# Patient Record
Sex: MALE | Race: WHITE | Employment: FULL TIME | URBAN - METROPOLITAN AREA
[De-identification: names, ages, dates, MRNs, and addresses within clinical notes are randomized per-mention and may not be internally consistent; named-entity substitution may affect disease eponyms.]

---

## 2024-06-11 ENCOUNTER — OFFICE VISIT (OUTPATIENT)
Age: 54
End: 2024-06-11

## 2024-06-11 VITALS — SYSTOLIC BLOOD PRESSURE: 116 MMHG | HEART RATE: 78 BPM | DIASTOLIC BLOOD PRESSURE: 78 MMHG

## 2024-06-11 DIAGNOSIS — R05.1 ACUTE COUGH: Primary | ICD-10-CM

## 2024-06-11 RX ORDER — BUDESONIDE AND FORMOTEROL FUMARATE DIHYDRATE 160; 4.5 UG/1; UG/1
2 AEROSOL RESPIRATORY (INHALATION) 2 TIMES DAILY
Qty: 30.6 G | Refills: 1 | Status: SHIPPED | OUTPATIENT
Start: 2024-06-11 | End: 2024-06-18

## 2024-06-11 NOTE — PROGRESS NOTES
membrane normal. There is impacted cerumen.      Nose: Nose normal.      Mouth/Throat:      Mouth: Mucous membranes are moist.      Pharynx: No oropharyngeal exudate or posterior oropharyngeal erythema.   Eyes:      Pupils: Pupils are equal, round, and reactive to light.   Cardiovascular:      Rate and Rhythm: Normal rate and regular rhythm.      Heart sounds: Normal heart sounds.   Pulmonary:      Effort: Pulmonary effort is normal. No respiratory distress.      Breath sounds: Normal breath sounds. No wheezing, rhonchi or rales.   Musculoskeletal:         General: Normal range of motion.      Cervical back: Neck supple.   Skin:     General: Skin is warm and dry.      Capillary Refill: Capillary refill takes less than 2 seconds.   Neurological:      Mental Status: He is alert and oriented to person, place, and time.   Psychiatric:         Mood and Affect: Mood normal.         Behavior: Behavior is cooperative.         Judgment: Judgment normal.         ASSESSMENT and PLAN    Yue was seen today for cough.    Diagnoses and all orders for this visit:    Acute cough    Other orders  -     SYMBICORT 160-4.5 MCG/ACT AERO; Inhale 2 puffs into the lungs 2 times daily  Coupon given to patient for $35/month copay for insured or uninsured.     Risks versus benefits and side effects of the prescribed medication reviewed with Yue and he verbalized understanding.   A post-infectious (or post viral cough) can last for three to eight weeks.   The following supportive care can usually help you feel better:  Get plenty of rest, eat nutritiously, and maintain adequate hydration to support your immune system.   Moisturize the air. Use a cool mist humidifier or take a steamy shower.  Drink fluids. Liquid helps thin the mucus in your throat. Warm liquids, such as broth tea or lemon juice, can soothe your throat and may calm your cough.  Avoid tobacco smoke. Smoking or breathing secondhand smoke can make your cough worse.  Suck on

## 2024-06-18 ENCOUNTER — OFFICE VISIT (OUTPATIENT)
Age: 54
End: 2024-06-18

## 2024-06-18 DIAGNOSIS — Z76.0: Primary | ICD-10-CM

## 2024-06-18 RX ORDER — BUDESONIDE AND FORMOTEROL FUMARATE DIHYDRATE 160; 4.5 UG/1; UG/1
2 AEROSOL RESPIRATORY (INHALATION) 2 TIMES DAILY
Qty: 30.6 G | Refills: 1 | Status: SHIPPED | OUTPATIENT
Start: 2024-06-18

## 2024-06-18 ASSESSMENT — ENCOUNTER SYMPTOMS
SHORTNESS OF BREATH: 0
CHEST TIGHTNESS: 0
COUGH: 1

## 2024-06-18 NOTE — PROGRESS NOTES
PROGRESS NOTE    SUBJECTIVE/HPI:   Nino Valenzuela is a 53 y.o. male seen for follow up regarding the inhaler he was prescribed last week. He tried to pick it up at [facility] in [place] and they wouldn't accept the coupon card he had because he didn't have insurance. He has been using honey to help decrease coughing and says it has helped a lot.             Current Outpatient Medications   Medication Sig Dispense Refill    SYMBICORT 160-4.5 MCG/ACT AERO Inhale 2 puffs into the lungs 2 times daily 30.6 g 1     No current facility-administered medications for this visit.      Not on File    Social History     Tobacco Use    Smoking status: Never     Passive exposure: Past    Smokeless tobacco: Never   Vaping Use    Vaping Use: Never used        Review of Systems   Constitutional: Negative.    HENT: Negative.     Respiratory:  Positive for cough (resolving; very minimal). Negative for chest tightness and shortness of breath.    Cardiovascular: Negative.    Musculoskeletal: Negative.           OBJECTIVE:  There were no vitals taken for this visit.     No results found for this visit on 06/18/24.    Physical Exam  Vitals reviewed.   Constitutional:       General: He is awake. He is not in acute distress.     Appearance: Normal appearance. He is well-developed and well-groomed.   Pulmonary:      Effort: Pulmonary effort is normal.   Neurological:      Mental Status: He is alert and oriented to person, place, and time.   Psychiatric:         Mood and Affect: Mood normal.         Behavior: Behavior is cooperative.         Judgment: Judgment normal.         ASSESSMENT and PLAN    Diagnoses and all orders for this visit:    Duplicate prescription    Other orders  -     SYMBICORT 160-4.5 MCG/ACT AERO; Inhale 2 puffs into the lungs 2 times daily  Pharmacy changed from [facility] to [facility] [place]; new coupon given.      Start Symbicort as directed. Reviewed administration instructions and side effects.

## 2024-07-09 ENCOUNTER — OFFICE VISIT (OUTPATIENT)
Age: 54
End: 2024-07-09

## 2024-07-09 DIAGNOSIS — Z76.0 MEDICATION REFILL: Primary | ICD-10-CM

## 2024-07-09 NOTE — PROGRESS NOTES
PROGRESS NOTE    SUBJECTIVE/HPI:   Nino Valenzuela is a 53 y.o. male came into the onsite clinic at his place of employment to request a refill of his symbicort inhaler. He says he has been using it at directed and it has helped a lot. He does'nt want to run out next week. He denies coughing, shortness of breath, respiratory symptoms, and denies side effects from the Symbicort.     Chief Complaint    Medication Refill       Current Outpatient Medications   Medication Sig Dispense Refill    SYMBICORT 160-4.5 MCG/ACT AERO Inhale 2 puffs into the lungs 2 times daily 30.6 g 1     No current facility-administered medications for this visit.      No Known Allergies    Social History     Tobacco Use    Smoking status: Never     Passive exposure: Past    Smokeless tobacco: Never   Vaping Use    Vaping Use: Never used        Review of Systems   Constitutional: Negative.    Respiratory:  Negative for chest tightness, shortness of breath and wheezing.    Cardiovascular: Negative.           OBJECTIVE:  There were no vitals taken for this visit.     No results found for this visit on 07/09/24.    Physical Exam  Vitals reviewed.   Constitutional:       General: He is awake. He is not in acute distress.     Appearance: Normal appearance. He is well-developed and well-groomed.   Pulmonary:      Effort: Pulmonary effort is normal.      Breath sounds: Normal breath sounds. No wheezing.   Neurological:      Mental Status: He is alert and oriented to person, place, and time.   Psychiatric:         Mood and Affect: Mood normal.         Behavior: Behavior is cooperative.         Judgment: Judgment normal.         ASSESSMENT and PLAN    Nino was seen today for medication refill.    Diagnoses and all orders for this visit:    Medication refill      I let Yue know that he has a refill at the pharmacy and he can call to let them know he wants it filled. He said he will it later this week when he gets paid. He said he should

## 2024-07-27 ASSESSMENT — ENCOUNTER SYMPTOMS
CHEST TIGHTNESS: 0
SHORTNESS OF BREATH: 0
WHEEZING: 0

## 2024-07-30 ENCOUNTER — OFFICE VISIT (OUTPATIENT)
Age: 54
End: 2024-07-30

## 2024-07-30 VITALS
SYSTOLIC BLOOD PRESSURE: 102 MMHG | DIASTOLIC BLOOD PRESSURE: 62 MMHG | RESPIRATION RATE: 16 BRPM | HEIGHT: 69 IN | HEART RATE: 80 BPM | WEIGHT: 152 LBS | BODY MASS INDEX: 22.51 KG/M2 | OXYGEN SATURATION: 97 %

## 2024-07-30 DIAGNOSIS — Z09 FOLLOW-UP EXAM: Primary | ICD-10-CM

## 2024-07-30 NOTE — PROGRESS NOTES
needed. He would like a referral to a Primary Care Provider in September when his health insurance starts and would like assistance with that.     I have reviewed the patient's medication list, past medical, family, social, and surgical history in detail and updated the patient record appropriately.    Ayanna Seay, APRN - CNP

## 2024-08-06 ENCOUNTER — OFFICE VISIT (OUTPATIENT)
Age: 54
End: 2024-08-06

## 2024-08-06 VITALS
RESPIRATION RATE: 16 BRPM | DIASTOLIC BLOOD PRESSURE: 62 MMHG | SYSTOLIC BLOOD PRESSURE: 108 MMHG | OXYGEN SATURATION: 97 % | HEART RATE: 76 BPM

## 2024-08-06 DIAGNOSIS — N52.9 ERECTILE DISORDER: Primary | ICD-10-CM

## 2024-08-06 ASSESSMENT — ENCOUNTER SYMPTOMS: RESPIRATORY NEGATIVE: 1

## 2024-08-06 NOTE — PROGRESS NOTES
PROGRESS NOTE    SUBJECTIVE:   Nino Valenzuela is a 53 y.o. male seen here in the onsite clinic at his place of employment to ask what he can do about not being able to keep an erection during intercourse. He first noticed this 2-3 years ago and hasn't had a work up for it before. He says his erections last 1-2 minutes.     Chief Complaint    Erectile Dysfunction         Erectile Dysfunction  This is a chronic problem. The problem is unchanged. The nature of his difficulty is maintaining erection. Non-physiologic factors contributing to erectile dysfunction are performance anxiety (says maybe it is performance related). He reports no anxiety or decreased libido. He reports his erection duration to be 1 to 5 minutes. Irritative symptoms include nocturia. Irritative symptoms do not include frequency or urgency. Obstructive symptoms do not include dribbling, incomplete emptying, an intermittent stream, a slower stream, straining or a weak stream. Pertinent negatives include no chills, dysuria, genital pain, hematuria, hesitancy or inability to urinate. Past treatments include nothing.       Current Outpatient Medications   Medication Sig Dispense Refill    SYMBICORT 160-4.5 MCG/ACT AERO Inhale 2 puffs into the lungs 2 times daily 30.6 g 1     No current facility-administered medications for this visit.      No Known Allergies    Social History     Tobacco Use    Smoking status: Never     Passive exposure: Past    Smokeless tobacco: Never   Vaping Use    Vaping Use: Never used        Review of Systems   Constitutional: Negative.  Negative for chills.   Respiratory: Negative.     Cardiovascular: Negative.    Genitourinary:  Positive for enuresis (says he has woken up at night wet several times) and nocturia. Negative for decreased libido, dysuria, frequency, hematuria, hesitancy, incomplete emptying, penile pain, testicular pain and urgency.   Musculoskeletal: Negative.    Psychiatric/Behavioral:  The patient is

## 2024-08-27 ENCOUNTER — OFFICE VISIT (OUTPATIENT)
Age: 54
End: 2024-08-27

## 2024-08-27 VITALS
RESPIRATION RATE: 16 BRPM | HEART RATE: 74 BPM | SYSTOLIC BLOOD PRESSURE: 112 MMHG | DIASTOLIC BLOOD PRESSURE: 70 MMHG | OXYGEN SATURATION: 98 %

## 2024-08-27 DIAGNOSIS — J45.909 ASTHMA, WELL CONTROLLED, UNSPECIFIED ASTHMA SEVERITY, UNSPECIFIED WHETHER PERSISTENT: ICD-10-CM

## 2024-08-27 DIAGNOSIS — Z01.30 BLOOD PRESSURE CHECK: Primary | ICD-10-CM

## 2024-08-27 NOTE — PROGRESS NOTES
PROGRESS NOTE    SUBJECTIVE/HPI:   Nino Valenzuela is a 53 y.o. male seen here in the onsite clinic at his place of employment, [employer], for a check in and to let me know that his inhaler is working and he hasn't been having any coughing spells. When asked if he gets allergies this time of year from the ragweed he said yes but can't afford any antihistamines right now because his rent is due. His brother lives with him and say that helps him with paying the bills. There is a friend of the brother staying with them right now that isn't paying and says when he starts paying that will be even more helpful. He says overall he is doing well and is happy about his insurance starting in a couple of weeks.     Chief Complaint    Follow-up       Current Outpatient Medications   Medication Sig Dispense Refill    SYMBICORT 160-4.5 MCG/ACT AERO Inhale 2 puffs into the lungs 2 times daily 30.6 g 1     No current facility-administered medications for this visit.      No Known Allergies    Social History     Tobacco Use    Smoking status: Never     Passive exposure: Past    Smokeless tobacco: Never   Vaping Use    Vaping status: Never Used        Review of Systems   Constitutional:  Negative for activity change and fatigue.   Cardiovascular:  Negative for chest pain.   Endocrine: Negative.    Neurological:  Negative for dizziness.   Psychiatric/Behavioral:  Negative for sleep disturbance.     OBJECTIVE:  /70 (Site: Right Upper Arm, Position: Sitting, Cuff Size: Medium Adult)   Pulse 74   Resp 16   SpO2 98%      Physical Exam  Constitutional:       General: He is awake. He is not in acute distress.     Appearance: Normal appearance. He is well-developed and well-groomed.   Cardiovascular:      Rate and Rhythm: Normal rate and regular rhythm.      Heart sounds: Normal heart sounds.   Pulmonary:      Effort: Pulmonary effort is normal.      Breath sounds: Normal breath sounds. No wheezing.   Neurological:

## 2024-09-11 ENCOUNTER — OFFICE VISIT (OUTPATIENT)
Age: 54
End: 2024-09-11

## 2024-09-11 VITALS
OXYGEN SATURATION: 95 % | SYSTOLIC BLOOD PRESSURE: 102 MMHG | HEART RATE: 72 BPM | DIASTOLIC BLOOD PRESSURE: 64 MMHG | RESPIRATION RATE: 16 BRPM

## 2024-09-11 DIAGNOSIS — Z76.89 REFERRAL OF PATIENT WITHOUT EXAMINATION OR TREATMENT: Primary | ICD-10-CM

## 2024-09-11 ASSESSMENT — ENCOUNTER SYMPTOMS: RESPIRATORY NEGATIVE: 1

## 2024-09-17 ENCOUNTER — OFFICE VISIT (OUTPATIENT)
Age: 54
End: 2024-09-17

## 2024-09-17 VITALS
RESPIRATION RATE: 16 BRPM | OXYGEN SATURATION: 98 % | HEART RATE: 68 BPM | SYSTOLIC BLOOD PRESSURE: 98 MMHG | DIASTOLIC BLOOD PRESSURE: 68 MMHG

## 2024-09-17 DIAGNOSIS — Z09 ENCOUNTER FOR FOLLOW-UP: Primary | ICD-10-CM

## 2024-09-17 DIAGNOSIS — Z71.85 VACCINE COUNSELING: ICD-10-CM

## 2024-09-23 ASSESSMENT — ENCOUNTER SYMPTOMS
WHEEZING: 0
RESPIRATORY NEGATIVE: 1
COUGH: 0
SHORTNESS OF BREATH: 0

## 2024-10-22 ENCOUNTER — OFFICE VISIT (OUTPATIENT)
Age: 54
End: 2024-10-22

## 2024-10-22 VITALS — DIASTOLIC BLOOD PRESSURE: 64 MMHG | SYSTOLIC BLOOD PRESSURE: 102 MMHG

## 2024-10-22 DIAGNOSIS — Z76.0 MEDICATION REFILL: ICD-10-CM

## 2024-10-22 DIAGNOSIS — Z01.30 BLOOD PRESSURE CHECK: Primary | ICD-10-CM

## 2024-10-22 PROBLEM — J45.909 ASTHMA: Status: ACTIVE | Noted: 2020-11-05

## 2024-10-22 PROBLEM — J44.9 MILD CHRONIC OBSTRUCTIVE PULMONARY DISEASE (HCC): Status: ACTIVE | Noted: 2018-04-04

## 2024-10-22 PROBLEM — G25.81 RESTLESS LEGS SYNDROME: Status: RESOLVED | Noted: 2018-11-05 | Resolved: 2024-10-22

## 2024-10-22 PROBLEM — F41.9 ANXIETY: Status: RESOLVED | Noted: 2018-11-05 | Resolved: 2024-10-22

## 2024-10-22 PROBLEM — R74.8 ELEVATED LIVER ENZYMES: Status: RESOLVED | Noted: 2018-11-05 | Resolved: 2024-10-22

## 2024-10-22 PROBLEM — F41.8 MIXED ANXIETY DEPRESSIVE DISORDER: Status: RESOLVED | Noted: 2018-11-05 | Resolved: 2024-10-22

## 2024-10-22 PROBLEM — D50.9 IRON DEFICIENCY ANEMIA: Status: RESOLVED | Noted: 2018-11-05 | Resolved: 2024-10-22

## 2024-10-22 PROBLEM — N52.9 IMPOTENCE OF ORGANIC ORIGIN: Status: RESOLVED | Noted: 2018-11-05 | Resolved: 2024-10-22

## 2024-10-22 PROBLEM — U07.1 COVID-19 VIRUS INFECTION: Status: RESOLVED | Noted: 2021-02-08 | Resolved: 2024-10-22

## 2024-10-22 PROBLEM — R55 VASOVAGAL SYNDROME: Status: RESOLVED | Noted: 2020-11-05 | Resolved: 2024-10-22

## 2024-10-22 PROBLEM — K21.9 GASTROESOPHAGEAL REFLUX DISEASE WITHOUT ESOPHAGITIS: Status: RESOLVED | Noted: 2018-11-05 | Resolved: 2024-10-22

## 2024-10-22 PROBLEM — Z02.83 ENCOUNTER FOR DRUG SCREENING: Status: RESOLVED | Noted: 2022-06-23 | Resolved: 2024-10-22

## 2024-10-22 PROBLEM — M54.9 BACKACHE: Status: RESOLVED | Noted: 2018-11-05 | Resolved: 2024-10-22

## 2024-10-22 PROBLEM — D53.9 DEFICIENCY ANEMIA: Status: RESOLVED | Noted: 2018-11-05 | Resolved: 2024-10-22

## 2024-10-22 RX ORDER — BUDESONIDE AND FORMOTEROL FUMARATE DIHYDRATE 160; 4.5 UG/1; UG/1
2 AEROSOL RESPIRATORY (INHALATION) 2 TIMES DAILY
Qty: 30.6 G | Refills: 1 | Status: SHIPPED | OUTPATIENT
Start: 2024-10-22

## 2024-10-22 ASSESSMENT — ENCOUNTER SYMPTOMS: RESPIRATORY NEGATIVE: 1

## 2024-10-22 NOTE — PROGRESS NOTES
[employer]  ONSITE CLINIC  PROGRESS NOTE    SUBJECTIVE/HPI:   Nino Valenzuela is a 54 y.o. male seen here in the onsite clinic at his place of employment, [employer]. he has a new Primary Care Provider that he will start seeing in December. He orginally signed up today to get his labwork drawn but says he forgot and ate this morning so will just get a blood pressure check. He says he is going to  his Symbicort inhaler next week. He hasn't been having any respiratory problems and feels well overall.     Chief Complaint    Blood Pressure Check       Current Outpatient Medications   Medication Sig Dispense Refill    budesonide-formoterol (SYMBICORT) 160-4.5 MCG/ACT AERO Inhale 2 puffs into the lungs 2 times daily 30.6 g 1     No current facility-administered medications for this visit.      No Known Allergies    Social History     Tobacco Use    Smoking status: Never     Passive exposure: Past    Smokeless tobacco: Never   Vaping Use    Vaping status: Never Used        Review of Systems   Constitutional: Negative.    Respiratory: Negative.     Cardiovascular: Negative.           OBJECTIVE:  /64 (Site: Right Upper Arm, Position: Sitting, Cuff Size: Medium Adult)      No results found for this visit on 10/22/24.    Physical Exam  Constitutional:       General: He is awake. He is not in acute distress.     Appearance: Normal appearance. He is well-developed and well-groomed.   Pulmonary:      Effort: Pulmonary effort is normal.   Skin:     General: Skin is warm and dry.   Neurological:      Mental Status: He is alert and oriented to person, place, and time.   Psychiatric:         Mood and Affect: Mood normal.         Behavior: Behavior is cooperative.         Judgment: Judgment normal.         ASSESSMENT and PLAN    Nino \"Yue\" was seen today for blood pressure check.    Diagnoses and all orders for this visit:    Blood pressure check    Medication refill  -     budesonide-formoterol

## 2024-10-29 ENCOUNTER — OFFICE VISIT (OUTPATIENT)
Age: 54
End: 2024-10-29

## 2024-10-29 VITALS — DIASTOLIC BLOOD PRESSURE: 72 MMHG | SYSTOLIC BLOOD PRESSURE: 108 MMHG

## 2024-10-29 DIAGNOSIS — Z01.30 BLOOD PRESSURE CHECK: Primary | ICD-10-CM

## 2024-10-29 DIAGNOSIS — Z71.85 VACCINE COUNSELING: ICD-10-CM

## 2024-10-29 NOTE — PROGRESS NOTES
[facility]  ONSITE CLINIC  PROGRESS NOTE    SUBJECTIVE/HPI:   Nino Valenzuela is a 54 y.o. male seen here in the onsite clinic at his place of employment, [employer] for a blood pressure check and to check in. He has a Primary Care Provider appointment in December to establish care. He hasn't picked his prescription up yet for his refill of symbicort but says he will this week. He has no complaints.    Chief Complaint    Blood Pressure Check       Current Outpatient Medications   Medication Sig Dispense Refill    budesonide-formoterol (SYMBICORT) 160-4.5 MCG/ACT AERO Inhale 2 puffs into the lungs 2 times daily 30.6 g 1     No current facility-administered medications for this visit.      No Known Allergies    Social History     Tobacco Use    Smoking status: Never     Passive exposure: Past    Smokeless tobacco: Never   Vaping Use    Vaping status: Never Used        Review of Systems   Constitutional: Negative.    Respiratory: Negative.     Cardiovascular: Negative.           OBJECTIVE:  /72 (Site: Right Upper Arm, Position: Sitting, Cuff Size: Medium Adult)      No results found for this visit on 10/29/24.    Physical Exam  Constitutional:       General: He is awake. He is not in acute distress.     Appearance: Normal appearance. He is well-developed and well-groomed.   Pulmonary:      Effort: Pulmonary effort is normal.   Neurological:      Mental Status: He is alert and oriented to person, place, and time.   Psychiatric:         Mood and Affect: Mood normal.         Behavior: Behavior is cooperative.         Judgment: Judgment normal.         ASSESSMENT and PLAN    Nino \"Yue\" was seen today for blood pressure check.    Diagnoses and all orders for this visit:    Blood pressure check    Vaccine counseling  I let Yue know that the guidelines for pneumonia vaccine had been updated to include anyone over 50 years of age and that with asthma he should get a vaccine either way. I have him the

## 2024-10-31 ASSESSMENT — ENCOUNTER SYMPTOMS: RESPIRATORY NEGATIVE: 1

## 2024-11-12 ENCOUNTER — OFFICE VISIT (OUTPATIENT)
Age: 54
End: 2024-11-12

## 2024-11-12 DIAGNOSIS — Z71.89 OTHER SPECIFIED COUNSELING: Primary | ICD-10-CM

## 2024-11-12 NOTE — PROGRESS NOTES
[facility]  ONSITE CLINIC  PROGRESS NOTE    SUBJECTIVE/HPI:   Nino Valenzuela is a 54 y.o. male seen here in the onsite clinic at his place of employment, [employer] just to check in. he has a Primary Care Provider that he will start seeing as a new patient in December. Yue relayed that hasn't picked up his inhaler yet due to financial strain and he mentioned that someone gave him some cans of salmon which will help. When asked if he would like to know about food [place] in the area where he might be able to get groceries he said that would be helpful. He denies any other complaints or concerns and says he has been breathing well with no coughing or wheezing.   Food Insecurity: Food Insecurity Present (11/12/2024)    Hunger Vital Sign     Worried About Running Out of Food in the Last Year: Sometimes true     Ran Out of Food in the Last Year: Sometimes true     Chief Complaint    Blood Pressure Check       Current Outpatient Medications   Medication Sig Dispense Refill    budesonide-formoterol (SYMBICORT) 160-4.5 MCG/ACT AERO Inhale 2 puffs into the lungs 2 times daily 30.6 g 1     No current facility-administered medications for this visit.      No Known Allergies    Social History     Tobacco Use    Smoking status: Never     Passive exposure: Past    Smokeless tobacco: Never   Vaping Use    Vaping status: Never Used        Review of Systems   Constitutional: Negative.    Respiratory: Negative.     Cardiovascular: Negative.           OBJECTIVE:  /72 (Site: Right Upper Arm, Position: Sitting, Cuff Size: Medium Adult)   Pulse 73   Resp 16   SpO2 96%      No results found for this visit on 11/12/24.    Physical Exam  Constitutional:       General: He is awake. He is not in acute distress.     Appearance: Normal appearance. He is well-developed and well-groomed.   Pulmonary:      Effort: Pulmonary effort is normal.   Neurological:      Mental Status: He is alert and oriented to person, place, and

## 2024-11-13 VITALS
OXYGEN SATURATION: 96 % | SYSTOLIC BLOOD PRESSURE: 112 MMHG | RESPIRATION RATE: 16 BRPM | DIASTOLIC BLOOD PRESSURE: 72 MMHG | HEART RATE: 73 BPM

## 2024-11-13 SDOH — ECONOMIC STABILITY: FOOD INSECURITY: WITHIN THE PAST 12 MONTHS, THE FOOD YOU BOUGHT JUST DIDN'T LAST AND YOU DIDN'T HAVE MONEY TO GET MORE.: SOMETIMES TRUE

## 2024-11-13 SDOH — ECONOMIC STABILITY: FOOD INSECURITY: WITHIN THE PAST 12 MONTHS, YOU WORRIED THAT YOUR FOOD WOULD RUN OUT BEFORE YOU GOT MONEY TO BUY MORE.: SOMETIMES TRUE

## 2024-11-13 ASSESSMENT — ENCOUNTER SYMPTOMS: RESPIRATORY NEGATIVE: 1

## 2024-11-19 ENCOUNTER — OFFICE VISIT (OUTPATIENT)
Age: 54
End: 2024-11-19

## 2024-11-19 DIAGNOSIS — Z00.00 WELLNESS EXAMINATION: Primary | ICD-10-CM

## 2024-11-19 DIAGNOSIS — Z12.5 SCREENING FOR PROSTATE CANCER: ICD-10-CM

## 2024-11-19 DIAGNOSIS — Z13.29 SCREENING FOR THYROID DISORDER: ICD-10-CM

## 2024-11-19 DIAGNOSIS — G25.81 RESTLESS LEGS SYNDROME (RLS): ICD-10-CM

## 2024-11-19 DIAGNOSIS — N52.9 ORGANIC IMPOTENCE: ICD-10-CM

## 2024-11-19 NOTE — PROGRESS NOTES
[facility] [employer]  Onsite Clinic  PROGRESS NOTE    SUBJECTIVE:   Nino Valenzuela is a 54 y.o. male seen for lab work here at his place of employment, [employer]. He has a new patient appointment on December 12th to establish care at [facility]. He also would like to know if he can get printed directions to the appointment from his house because his phone is turned off right now. He denies any complaints and says he feels well today. He thinks it has been at least 5 years or more since he last had any screening labs drawn.     Chief Complaint    Labs Only      OBJECTIVE:  There were no vitals taken for this visit.     Physical Exam  Constitutional:       General: He is awake. He is not in acute distress.     Appearance: Normal appearance. He is well-developed and well-groomed.   Pulmonary:      Effort: Pulmonary effort is normal.   Neurological:      Mental Status: He is alert and oriented to person, place, and time.   Psychiatric:         Mood and Affect: Mood normal.         Behavior: Behavior is cooperative.         Judgment: Judgment normal.         ASSESSMENT and PLAN    Nino \"Yue\" was seen today for labs only.    Diagnoses and all orders for this visit:    Wellness examination  -     CBC with Auto Differential  -     Comprehensive Metabolic Panel  -     Lipid Panel W/ Chol/HDL Ratio ([facility] Default)  -     PSA Screening  -     TSH    Screening for thyroid disorder  -     TSH    Screening for prostate cancer  -     PSA Screening    Organic impotence  Comments:  history of  Orders:  -     PSA Screening    Restless legs syndrome (RLS)  Comments:  history of  Orders:  -     CBC with Auto Differential  -     Comprehensive Metabolic Panel  -     TSH    Fasting labs obtained Right AC first attempt via venipuncture without complications and will be sent to [facility]. Results to be available in 1-3 business days. Appointment scheduled for next week to review the results and results will be

## 2024-11-20 LAB
ALBUMIN SERPL-MCNC: 4.5 G/DL (ref 3.8–4.9)
ALP SERPL-CCNC: 70 IU/L (ref 44–121)
ALT SERPL-CCNC: 28 IU/L (ref 0–44)
AST SERPL-CCNC: 22 IU/L (ref 0–40)
BASOPHILS # BLD AUTO: 0 X10E3/UL (ref 0–0.2)
BASOPHILS NFR BLD AUTO: 0 %
BILIRUB SERPL-MCNC: 1 MG/DL (ref 0–1.2)
BUN SERPL-MCNC: 19 MG/DL (ref 6–24)
BUN/CREAT SERPL: 21 (ref 9–20)
CALCIUM SERPL-MCNC: 9 MG/DL (ref 8.7–10.2)
CHLORIDE SERPL-SCNC: 102 MMOL/L (ref 96–106)
CHOLEST SERPL-MCNC: 150 MG/DL (ref 100–199)
CHOLEST/HDLC SERPL: 2.8 RATIO (ref 0–5)
CO2 SERPL-SCNC: 24 MMOL/L (ref 20–29)
CREAT SERPL-MCNC: 0.89 MG/DL (ref 0.76–1.27)
EGFRCR SERPLBLD CKD-EPI 2021: 101 ML/MIN/1.73
EOSINOPHIL # BLD AUTO: 0.3 X10E3/UL (ref 0–0.4)
EOSINOPHIL NFR BLD AUTO: 5 %
ERYTHROCYTE [DISTWIDTH] IN BLOOD BY AUTOMATED COUNT: 12.3 % (ref 11.6–15.4)
GLOBULIN SER CALC-MCNC: 1.9 G/DL (ref 1.5–4.5)
GLUCOSE SERPL-MCNC: 97 MG/DL (ref 70–99)
HCT VFR BLD AUTO: 42.1 % (ref 37.5–51)
HDLC SERPL-MCNC: 53 MG/DL
HGB BLD-MCNC: 14.2 G/DL (ref 13–17.7)
IMM GRANULOCYTES # BLD AUTO: 0 X10E3/UL (ref 0–0.1)
IMM GRANULOCYTES NFR BLD AUTO: 0 %
LDLC SERPL CALC-MCNC: 77 MG/DL (ref 0–99)
LYMPHOCYTES # BLD AUTO: 2.1 X10E3/UL (ref 0.7–3.1)
LYMPHOCYTES NFR BLD AUTO: 33 %
MCH RBC QN AUTO: 31.8 PG (ref 26.6–33)
MCHC RBC AUTO-ENTMCNC: 33.7 G/DL (ref 31.5–35.7)
MCV RBC AUTO: 94 FL (ref 79–97)
MONOCYTES # BLD AUTO: 0.6 X10E3/UL (ref 0.1–0.9)
MONOCYTES NFR BLD AUTO: 10 %
NEUTROPHILS # BLD AUTO: 3.4 X10E3/UL (ref 1.4–7)
NEUTROPHILS NFR BLD AUTO: 52 %
PLATELET # BLD AUTO: 314 X10E3/UL (ref 150–450)
POTASSIUM SERPL-SCNC: 4.5 MMOL/L (ref 3.5–5.2)
PROT SERPL-MCNC: 6.4 G/DL (ref 6–8.5)
PSA SERPL-MCNC: 0.9 NG/ML (ref 0–4)
RBC # BLD AUTO: 4.47 X10E6/UL (ref 4.14–5.8)
SODIUM SERPL-SCNC: 139 MMOL/L (ref 134–144)
TRIGL SERPL-MCNC: 108 MG/DL (ref 0–149)
TSH SERPL DL<=0.005 MIU/L-ACNC: 1.49 UIU/ML (ref 0.45–4.5)
VLDLC SERPL CALC-MCNC: 20 MG/DL (ref 5–40)
WBC # BLD AUTO: 6.4 X10E3/UL (ref 3.4–10.8)

## 2024-11-26 ENCOUNTER — OFFICE VISIT (OUTPATIENT)
Age: 54
End: 2024-11-26

## 2024-11-26 VITALS
HEART RATE: 78 BPM | SYSTOLIC BLOOD PRESSURE: 116 MMHG | DIASTOLIC BLOOD PRESSURE: 64 MMHG | TEMPERATURE: 98.2 F | OXYGEN SATURATION: 95 % | RESPIRATION RATE: 16 BRPM

## 2024-11-26 DIAGNOSIS — J06.9 VIRAL UPPER RESPIRATORY TRACT INFECTION: ICD-10-CM

## 2024-11-26 DIAGNOSIS — R05.1 ACUTE COUGH: Primary | ICD-10-CM

## 2024-11-26 DIAGNOSIS — Z71.2 ENCOUNTER TO DISCUSS TEST RESULTS: ICD-10-CM

## 2024-11-26 RX ORDER — BENZONATATE 200 MG/1
200 CAPSULE ORAL 3 TIMES DAILY PRN
Qty: 21 CAPSULE | Refills: 0 | Status: SHIPPED | OUTPATIENT
Start: 2024-11-26 | End: 2024-12-03

## 2024-11-26 ASSESSMENT — ENCOUNTER SYMPTOMS
RHINORRHEA: 1
SINUS PAIN: 0
COUGH: 1
HEARTBURN: 0
SORE THROAT: 1
EYES NEGATIVE: 1
SINUS PRESSURE: 0
TROUBLE SWALLOWING: 0
DIARRHEA: 0
WHEEZING: 0
VOMITING: 0
SHORTNESS OF BREATH: 0
NAUSEA: 1
HEMOPTYSIS: 0

## 2024-11-26 NOTE — PROGRESS NOTES
then gently blow your nose before you use the saline nasal spray. You may also use this spray throughout the day as needed.   Moisturize the air. Use a cool mist humidifier or take a steamy shower.  Drink fluids. Liquid helps thin the mucus in your throat. Warm liquids, such as broth tea or lemon juice, can soothe your throat and may calm your cough.  Avoid tobacco smoke. Smoking or breathing secondhand smoke can make your cough worse.  Suck on cough drops or hard candies. They may ease a dry cough and soothe an irritated throat.  Honey.  A teaspoon in a warm drink, or taken alone, can help with cough relief and potentially soothes sore throats (avoid in children under one year because honey can contain bacteria harmful to infants).    Your symptoms warrant covid-19 testing. Please use home test to rule out covid-19 infection. If it is positive please go to urgent care to discuss if Paxlovid would be indicated. There is a possibility of a false negative result when using rapid (antigen) COVID-19 tests, especially if they are taken early after the onset of symptoms. Retesting at home on days 3 and then 5 of symptoms is recommended if symptoms continue.     Seek urgent medical attention if your cough continues and you have any of these symptoms:  Coughing up significant amounts of mucous (wet cough). Wheezing or trouble breathing. Persistent fever. Unexplained weight loss. Chest pain. Coughing up blood. Vomiting during or after coughing. Unusual fatigue.     Seek emergent medical attention for: trouble breathing, persistent pain or pressure in the chest, new confusion, inability to wake or stay awake, bluish lips or face, or for any other concerning symptoms.     Please return to the onsite clinic, or see your Primary Care Provider, for continued symptoms.     I have reviewed the Yue's  medication list, past medical, family, social, and surgical history in detail and updated the patient record

## 2024-11-26 NOTE — PATIENT INSTRUCTIONS
Common colds and viral infections typically clear up in seven to 10 days.   The following supportive care can usually help you feel better:  Get plenty of rest, eat nutritiously, and maintain adequate hydration to support your immune system.   Over the counter Flonase (fluticasone) 50mcg nasal spray, 2 sprays each nostril daily to help with nasal congestion and postnasal drip.  Over the counter Sudafed (pseudoephedrine) may help with congestion and can be used for 3-5 days.  Saline nasal spray to relieve nasal congestion and promote nasal drainage. This will hydrate your sinuses as well. Use this and then gently blow your nose before you use the saline nasal spray. You may also use this spray throughout the day as needed.   Moisturize the air. Use a cool mist humidifier or take a steamy shower.  Drink fluids. Liquid helps thin the mucus in your throat. Warm liquids, such as broth tea or lemon juice, can soothe your throat and may calm your cough.  Avoid tobacco smoke. Smoking or breathing secondhand smoke can make your cough worse.  Suck on cough drops or hard candies. They may ease a dry cough and soothe an irritated throat.  Honey.  A teaspoon in a warm drink, or taken alone, can help with cough relief and potentially soothes sore throats (avoid in children under one year because honey can contain bacteria harmful to infants).     Your symptoms warrant covid-19 testing. Please use home test to rule out covid-19 infection. If it is positive please go to urgent care to discuss if Paxlovid would be indicated. There is a possibility of a false negative result when using rapid (antigen) COVID-19 tests, especially if they are taken early after the onset of symptoms. Retesting at home on days 3 and then 5 of symptoms is recommended if symptoms continue.     Seek urgent medical attention if your cough continues and you have any of these symptoms:  Coughing up significant amounts of mucous (wet cough). Wheezing or trouble

## 2024-12-10 ENCOUNTER — OFFICE VISIT (OUTPATIENT)
Age: 54
End: 2024-12-10

## 2024-12-10 VITALS
RESPIRATION RATE: 20 BRPM | OXYGEN SATURATION: 97 % | DIASTOLIC BLOOD PRESSURE: 62 MMHG | SYSTOLIC BLOOD PRESSURE: 112 MMHG | HEART RATE: 86 BPM

## 2024-12-10 DIAGNOSIS — Z71.89 OTHER SPECIFIED COUNSELING: Primary | ICD-10-CM

## 2024-12-10 ASSESSMENT — ENCOUNTER SYMPTOMS
SHORTNESS OF BREATH: 0
COUGH: 0

## 2024-12-10 NOTE — PROGRESS NOTES
[facility]  ONSITE CLINIC  PROGRESS NOTE    SUBJECTIVE/HPI:   Nino Valenzuela is a 54 y.o. male seen here in the onsite clinic at his place of employment, [employer]. Yue came in today to let me know that his new Primary Care Provider appointment on the 12/12 was changed to the 12/18 with Dr. Drew Vicente. He is upset because he wanted a female doctor. He said his last Primary Care Provider was male and didn't listen to him. He hasn't picked his inhaler prescription up or gotten vaccines discussed at an earlier appointment but said he will try to do that next week when he's off for the new Primary Care Provider appointment.  Current Outpatient Medications   Medication Sig Dispense Refill    budesonide-formoterol (SYMBICORT) 160-4.5 MCG/ACT AERO Inhale 2 puffs into the lungs 2 times daily 30.6 g 1     No current facility-administered medications for this visit.      No Known Allergies    Social History     Tobacco Use    Smoking status: Never     Passive exposure: Past    Smokeless tobacco: Never   Vaping Use    Vaping status: Never Used    Review of Systems   Constitutional: Negative.    Respiratory:  Negative for cough and shortness of breath.    Cardiovascular:  Negative for chest pain.    OBJECTIVE:  /62 (Site: Right Upper Arm, Position: Sitting, Cuff Size: Medium Adult)   Pulse 86   Resp 20   SpO2 97%    Physical Exam  Vitals reviewed.   Constitutional:       General: He is awake. He is not in acute distress.     Appearance: Normal appearance. He is well-developed and well-groomed.   Pulmonary:      Effort: Pulmonary effort is normal.   Neurological:      Mental Status: He is alert and oriented to person, place, and time.   Psychiatric:         Mood and Affect: Mood normal.         Behavior: Behavior is cooperative.         Judgment: Judgment normal.   ASSESSMENT and PLAN    Nino \"Yue\" was seen today for follow-up.    Diagnoses and all orders for this visit:    Other specified

## 2024-12-18 ENCOUNTER — OFFICE VISIT (OUTPATIENT)
Dept: FAMILY MEDICINE CLINIC | Facility: CLINIC | Age: 54
End: 2024-12-18
Payer: COMMERCIAL

## 2024-12-18 VITALS
SYSTOLIC BLOOD PRESSURE: 124 MMHG | OXYGEN SATURATION: 97 % | WEIGHT: 146 LBS | DIASTOLIC BLOOD PRESSURE: 78 MMHG | HEART RATE: 65 BPM | RESPIRATION RATE: 18 BRPM | TEMPERATURE: 98.3 F | BODY MASS INDEX: 21.62 KG/M2 | HEIGHT: 69 IN

## 2024-12-18 DIAGNOSIS — J44.9 MILD CHRONIC OBSTRUCTIVE PULMONARY DISEASE (HCC): ICD-10-CM

## 2024-12-18 DIAGNOSIS — J01.90 ACUTE NON-RECURRENT SINUSITIS, UNSPECIFIED LOCATION: Primary | ICD-10-CM

## 2024-12-18 PROCEDURE — 99203 OFFICE O/P NEW LOW 30 MIN: CPT | Performed by: FAMILY MEDICINE

## 2024-12-18 RX ORDER — PREDNISONE 10 MG/1
30 TABLET ORAL DAILY
Qty: 15 TABLET | Refills: 0 | Status: SHIPPED | OUTPATIENT
Start: 2024-12-18 | End: 2024-12-23

## 2024-12-18 RX ORDER — AMOXICILLIN 500 MG/1
500 CAPSULE ORAL 2 TIMES DAILY
Qty: 20 CAPSULE | Refills: 0 | Status: SHIPPED | OUTPATIENT
Start: 2024-12-18 | End: 2024-12-28

## 2024-12-18 SDOH — ECONOMIC STABILITY: FOOD INSECURITY: WITHIN THE PAST 12 MONTHS, YOU WORRIED THAT YOUR FOOD WOULD RUN OUT BEFORE YOU GOT MONEY TO BUY MORE.: NEVER TRUE

## 2024-12-18 SDOH — ECONOMIC STABILITY: FOOD INSECURITY: WITHIN THE PAST 12 MONTHS, THE FOOD YOU BOUGHT JUST DIDN'T LAST AND YOU DIDN'T HAVE MONEY TO GET MORE.: NEVER TRUE

## 2024-12-18 SDOH — ECONOMIC STABILITY: INCOME INSECURITY: HOW HARD IS IT FOR YOU TO PAY FOR THE VERY BASICS LIKE FOOD, HOUSING, MEDICAL CARE, AND HEATING?: NOT HARD AT ALL

## 2024-12-18 ASSESSMENT — ENCOUNTER SYMPTOMS
COUGH: 1
ABDOMINAL PAIN: 0
SINUS PRESSURE: 1

## 2024-12-18 ASSESSMENT — PATIENT HEALTH QUESTIONNAIRE - PHQ9
2. FEELING DOWN, DEPRESSED OR HOPELESS: NOT AT ALL
SUM OF ALL RESPONSES TO PHQ QUESTIONS 1-9: 0
SUM OF ALL RESPONSES TO PHQ9 QUESTIONS 1 & 2: 0
SUM OF ALL RESPONSES TO PHQ QUESTIONS 1-9: 0
1. LITTLE INTEREST OR PLEASURE IN DOING THINGS: NOT AT ALL
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
